# Patient Record
Sex: MALE
[De-identification: names, ages, dates, MRNs, and addresses within clinical notes are randomized per-mention and may not be internally consistent; named-entity substitution may affect disease eponyms.]

---

## 2018-12-14 ENCOUNTER — HOSPITAL ENCOUNTER (OUTPATIENT)
Dept: HOSPITAL 47 - OR | Age: 58
Discharge: HOME | End: 2018-12-14
Attending: SURGERY
Payer: COMMERCIAL

## 2018-12-14 VITALS — SYSTOLIC BLOOD PRESSURE: 121 MMHG | DIASTOLIC BLOOD PRESSURE: 69 MMHG | HEART RATE: 73 BPM

## 2018-12-14 VITALS — TEMPERATURE: 97 F

## 2018-12-14 VITALS — BODY MASS INDEX: 34 KG/M2

## 2018-12-14 VITALS — RESPIRATION RATE: 18 BRPM

## 2018-12-14 DIAGNOSIS — Z88.0: ICD-10-CM

## 2018-12-14 DIAGNOSIS — Z85.828: ICD-10-CM

## 2018-12-14 DIAGNOSIS — K42.0: Primary | ICD-10-CM

## 2018-12-14 DIAGNOSIS — Z79.899: ICD-10-CM

## 2018-12-14 DIAGNOSIS — Z87.891: ICD-10-CM

## 2018-12-14 DIAGNOSIS — Z79.51: ICD-10-CM

## 2018-12-14 DIAGNOSIS — J45.909: ICD-10-CM

## 2018-12-14 DIAGNOSIS — K21.9: ICD-10-CM

## 2018-12-14 PROCEDURE — 88302 TISSUE EXAM BY PATHOLOGIST: CPT

## 2018-12-14 PROCEDURE — 64488 TAP BLOCK BI INJECTION: CPT

## 2018-12-14 PROCEDURE — 49653: CPT

## 2018-12-14 NOTE — P.OP
Date of Procedure: 12/14/18


Procedure(s) Performed: 


PREOPERATIVE DIAGNOSIS: Incarcerated umbilical hernia


POSTOPERATIVE DIAGNOSIS: Same


PROCEDURE: Incarcerated umbilical herniorrhaphy with mesh


SURGEON: Renny


EBL: Minimal


ANESTHESIA: General


COMPLICATIONS: None


OPERATIVE PROCEDURE: The patient was placed in the operating table in the 

supine position.  A periumbilical incision was made using the scalpel.  The 

subcutaneous tissues were dissected bluntly.  The hernia sac was identified.  

The umbilical attachments to the fascia were divided using electrocautery.  The 

hernia sac was excised.  The hernia sac was sent to pathology.  The 

preperitoneal space was dissected using blunt dissection and electrocautery.  

The 4.3 cm ventral ex mesh was placed beneath the fascia and sutured in place 

using 0 Ethibond sutures.  The defect was closed using interrupted vest over 

pants 0 Ethibond sutures.  The folding edge was also reapproximated using 0 

Ethibond sutures.  The subcutaneous tissues were reapproximated using inverted 3

-0 Vicryl sutures.  The umbilicus was tacked back down to the fascia using a 3-

0 Vicryl suture.  The skin was closed using 4-0 Monocryl sutures.  Skin glue 

and sterile dressings were then applied.


DISPOSITION: Stable to recovery room

## 2018-12-14 NOTE — P.ONQ
Anesthesiology Proc Note - PNB





- Peripheral Nerve Block Performed


  ** Bilateral Rectus Abdominis Single


Time Out Performed: Yes


Procedure Start Time: 07:13


Procedure Stop Time: 07:17


Indication: Requested by physician


Specifically requested for management of pain by DrCarlos: Malcolm Lawson


Sedation Type: Sedate with meaningful contact maintained


Preparation: Sterile Prep


Position: Supine ([place])


Needle Size: 100mm (4")


Needle Gauge: 21


Technique: Ultrasound


Injectate: 0.5% Ropivacaine (see comment for volume) (30 ml)


Blood Aspirated: No


Pain Paresthesia on Injection Noted: No


Resistance on Injection: Normal


Events: Uneventful and Well Tolerated

## 2018-12-14 NOTE — P.GSHP
History of Present Illness


H&P Date: 12/14/18


Chief Complaint: Umbilical hernia





Patient seen in the office previously.  Complaining of a bulge at the 

umbilicus.  Increased symptoms over the last 1-2 years.  Complaints of pain 

with coughing and straining.  This is nonreducible.





Past Medical History


Past Medical History: Asthma, Cancer, GERD/Reflux


Additional Past Medical History / Comment(s): umbilical hernia, skin cancer


History of Any Multi-Drug Resistant Organisms: None Reported


Additional Past Surgical History / Comment(s): mohs surgery on face x2, tendon 

repair middle finger lt hand


Past Anesthesia/Blood Transfusion Reactions: No Reported Reaction


Smoking Status: Former smoker





- Past Family History


  ** Father


Family Medical History: Cancer


Additional Family Medical History / Comment(s): lung,liver,oral cancer





  ** Mother


Family Medical History: Cancer


Additional Family Medical History / Comment(s): breast





Medications and Allergies


 Home Medications











 Medication  Instructions  Recorded  Confirmed  Type


 


Albuterol Inhaler [Ventolin Hfa 1 - 2 puff INHALATION [id number] PRN 12/12/18 12/14/ 18 History





Inhaler]    


 


Albuterol Nebulized [Ventolin 2.5 mg INHALATION DAILY PRN 12/12/18 12/12/18 

History





Nebulized]    


 


Budesonide/Formoterol Fumarate 2 puff INHALATION BID 12/12/18 12/14/18 History





[Symbicort 160-4.5 Mcg Inhaler]    


 


Omeprazole [PriLOSEC] 20 mg PO AC-BID 12/12/18 12/14/18 History


 


Fluticasone Nasal Spray [Flonase 1 spray EA NOSTRIL DAILY 12/14/18 12/14/18 

History





Nasal Spray]    


 


Loratadine [Claritin] 1 tab PO DAILY 12/14/18 12/14/18 History











 Allergies











Allergy/AdvReac Type Severity Reaction Status Date / Time


 


Penicillins Allergy  Unknown Verified 12/14/18 06:27


 


dust mites Allergy  Dyspnea Uncoded 12/14/18 06:27














Surgical - Exam


 Vital Signs











Temp Pulse Resp BP Pulse Ox


 


 98.0 F   82   18   120/75   96 


 


 12/14/18 06:40  12/14/18 06:40  12/14/18 06:40  12/14/18 06:40  12/14/18 06:40

















Physical exam:


General: Well-developed, well-nourished


HEENT: Normocephalic, sclerae nonicteric


Abdomen: Nontender, nondistended, incarcerated umbilical hernia


Extremities: No edema


Neuro: Alert and oriented





Assessment and Plan


(1) Incarcerated umbilical hernia


Narrative/Plan: 


Will proceed with umbilical hernia repair with mesh.    Risks of bleeding, 

infection, recurrence, bladder and bowel injury, numbness, nerve injury, 

conversion to an open procedure were discussed with the patient.  The patient 

understands and wishes to proceed.


Current Visit: Yes   Status: Acute   Code(s): K42.0 - UMBILICAL HERNIA WITH 

OBSTRUCTION, WITHOUT GANGRENE   SNOMED Code(s): 434105791

## 2021-09-15 ENCOUNTER — HOSPITAL ENCOUNTER (OUTPATIENT)
Dept: HOSPITAL 47 - ORWHC2ENDO | Age: 61
Discharge: HOME | End: 2021-09-15
Attending: INTERNAL MEDICINE
Payer: COMMERCIAL

## 2021-09-15 VITALS — SYSTOLIC BLOOD PRESSURE: 125 MMHG | DIASTOLIC BLOOD PRESSURE: 75 MMHG | HEART RATE: 75 BPM

## 2021-09-15 VITALS — RESPIRATION RATE: 16 BRPM

## 2021-09-15 VITALS — BODY MASS INDEX: 34.7 KG/M2

## 2021-09-15 VITALS — TEMPERATURE: 97.8 F

## 2021-09-15 DIAGNOSIS — K21.9: ICD-10-CM

## 2021-09-15 DIAGNOSIS — Z86.010: Primary | ICD-10-CM

## 2021-09-15 DIAGNOSIS — K57.30: ICD-10-CM

## 2021-09-15 DIAGNOSIS — D12.8: ICD-10-CM

## 2021-09-15 DIAGNOSIS — Z79.899: ICD-10-CM

## 2021-09-15 DIAGNOSIS — J45.909: ICD-10-CM

## 2021-09-15 DIAGNOSIS — Z87.19: ICD-10-CM

## 2021-09-15 PROCEDURE — 45380 COLONOSCOPY AND BIOPSY: CPT

## 2021-09-15 PROCEDURE — 88305 TISSUE EXAM BY PATHOLOGIST: CPT

## 2021-09-15 NOTE — P.PCN
Date of Procedure: 09/15/21


Procedure(s) Performed: 


BRIEF HISTORY: Patient is a 60-year-old pleasant  male scheduled for an elective

colonoscopy as a part of variation of prior history of colon polyps.  Last 

coloscopy was 5 years ago.





PROCEDURE PERFORMED: Colonoscopy with biopsy. 





PREOPERATIVE DIAGNOSIS: History of colon polyps. 





IV sedation per Anesthesia. 





PROCEDURE: After informed consent was obtained, the patient, was brought into 

the endoscopy unit. IV sedation was administered by Anesthesia under continuous 

monitoring.  Digital rectal examination was normal. Initially the Olympus CF-160

flexible video colonoscope was then inserted in the rectum, gradually advanced 

into the cecum without any difficulty. Careful examination was performed as the 

scope was gradually being withdrawn. Ileocecal valve and the appendiceal orifice

were visualized and appeared normal.  Prep was excellent. Mucosa of the cecum, 

ascending colon, transverse colon, descending colon, sigmoid colon, and rectum 

appeared normal.  There was a 3 mm sessile polyp in the proximal rectum that was

removed by cold biopsy.  Scattered left sided diverticulosis seen.  Retroflexion

was performed in the rectum and no lesions were seen. The patient tolerated the 

procedure well. 





IMPRESSION: 


3 mm sessile rectal polyp status post removal by cold biopsy


Scattered sigmoid diverticulosis





RECOMMENDATIONS:  Findings of this examination were discussed with the patient 

as well as his family.  He was advised to follow with the biopsy results.  If 

the biopsy reveals adenoma he can have a repeat colonoscopy in 5 years.